# Patient Record
(demographics unavailable — no encounter records)

---

## 2024-10-17 NOTE — HISTORY OF PRESENT ILLNESS
[FreeTextEntry1] : F/u [de-identified] : 21 yo male PMH spinal stenosis, ADHD, depression/anxiety presents for follow up. Patient established care and had CPE in Aug 2024.  Patient saw orthopedist today, states recovery overall looking good with labrum repair. Patient still following with psychiatrist for ADHD/depression/anxiety. Patient no longer sees therapist due to health issue with therapist, states he should be re-starting sessions soon. Patient really stressed at school, just had a lot of exams. Currently taking Wellbutrin 300mg qd, Vyvanse 20mg qd, duloxetine 20mg qd. May be increasing duloxetine to 40mg qd, has not been taking hydoxyzine. Patient states he still is having difficulty losing weight. Trying to eat better. Cannot run until next month. Not interested in GLP-1 or Metformin, states he will reassess after he is able to start exercising.

## 2024-10-17 NOTE — HISTORY OF PRESENT ILLNESS
[FreeTextEntry1] : F/u [de-identified] : 23 yo male PMH spinal stenosis, ADHD, depression/anxiety presents for follow up. Patient established care and had CPE in Aug 2024.  Patient saw orthopedist today, states recovery overall looking good with labrum repair. Patient still following with psychiatrist for ADHD/depression/anxiety. Patient no longer sees therapist due to health issue with therapist, states he should be re-starting sessions soon. Patient really stressed at school, just had a lot of exams. Currently taking Wellbutrin 300mg qd, Vyvanse 20mg qd, duloxetine 20mg qd. May be increasing duloxetine to 40mg qd, has not been taking hydoxyzine. Patient states he still is having difficulty losing weight. Trying to eat better. Cannot run until next month. Not interested in GLP-1 or Metformin, states he will reassess after he is able to start exercising.

## 2025-03-07 NOTE — HISTORY OF PRESENT ILLNESS
[FreeTextEntry1] : F/u [de-identified] : 22 yo male PMH spinal stenosis, ADHD, depression/anxiety presents for follow up.  Patient thinks he has sleep apnea. States he has always been told he snores during sleep - feels likes symptoms have gotten worse past few months. Wakes up not feeling rested, also c/o night sweats. Denies waking up with HA. States family members have heard him snoring through walls.  Would trial GLP-1 if needed.

## 2025-06-12 NOTE — HISTORY OF PRESENT ILLNESS
[FreeTextEntry1] : F/u [de-identified] : 22 yo male PMH spinal stenosis, ADHD, depression/anxiety presents for follow up. Patient had HST in May 2025, showed "up to mild BHAKTI", has appt with Dr. Tinoco in Aug 2025, interested in treatment. States he snores very loud and wants to trial CPAP. Patient also interested in trialing Zepbound for sleep apnea treatment. Patient denies any personal or family history of medullary thyroid cancers, MEN syndrome or pancreatitis.  Patient states his psychiatrist is leaving for maternity leave, may need temporary refill of Vyvanse. Patient also c/o intermittent ratting sound in lungs while laying flat and nasal congestion.